# Patient Record
Sex: MALE | Race: WHITE | NOT HISPANIC OR LATINO | Employment: FULL TIME | URBAN - METROPOLITAN AREA
[De-identification: names, ages, dates, MRNs, and addresses within clinical notes are randomized per-mention and may not be internally consistent; named-entity substitution may affect disease eponyms.]

---

## 2017-06-09 ENCOUNTER — OFFICE VISIT (OUTPATIENT)
Dept: URGENT CARE | Facility: CLINIC | Age: 22
End: 2017-06-09
Payer: COMMERCIAL

## 2017-06-09 VITALS
HEART RATE: 67 BPM | SYSTOLIC BLOOD PRESSURE: 118 MMHG | DIASTOLIC BLOOD PRESSURE: 80 MMHG | HEIGHT: 72 IN | OXYGEN SATURATION: 98 % | TEMPERATURE: 97.7 F | BODY MASS INDEX: 22.35 KG/M2 | RESPIRATION RATE: 16 BRPM | WEIGHT: 165 LBS

## 2017-06-09 DIAGNOSIS — M25.562 ACUTE PAIN OF LEFT KNEE: ICD-10-CM

## 2017-06-09 DIAGNOSIS — G47.00 INSOMNIA, UNSPECIFIED TYPE: ICD-10-CM

## 2017-06-09 PROCEDURE — 99204 OFFICE O/P NEW MOD 45 MIN: CPT | Performed by: FAMILY MEDICINE

## 2017-06-09 RX ORDER — ALPRAZOLAM 0.5 MG/1
0.5 TABLET ORAL NIGHTLY PRN
Qty: 10 TAB | Refills: 0 | Status: SHIPPED | OUTPATIENT
Start: 2017-06-09 | End: 2017-09-01

## 2017-06-09 NOTE — MR AVS SNAPSHOT
Vincenzo Katz   2017 2:15 PM   Office Visit   MRN: [id number]    Department:  [facility]   Dept Phone:  [phone]    Description:  Male : 1995   Provider:  Ishmael Licea M.D.           Reason for Visit     Knee Pain x 1 day/ left knee/ non injury      Allergies as of 2017     No Known Allergies      You were diagnosed with     Acute pain of left knee   [6741499]       Insomnia, unspecified type   [[id number]]         Vital Signs     Blood Pressure Pulse Temperature Respirations Height Weight    118/80 mmHg 67 36.5 °C (97.7 °F) 16 1.829 m (6') 74.844 kg (165 lb)    Body Mass Index Oxygen Saturation Smoking Status             22.37 kg/m2 98% Current Every Day Smoker         Basic Information     Date Of Birth Sex Race Ethnicity Preferred Language    1995 Male White Non- English      Health Maintenance        Date Due Completion Dates    IMM HEP B VACCINE (1 of 3 - Primary Series) 1993 ---    IMM HEP A VACCINE (1 of 2 - Standard Series) 1994 ---    IMM HPV VACCINE (1 of 3 - Male 3 Dose Series) 2004 ---    IMM VARICELLA (CHICKENPOX) VACCINE (1 of 2 - 2 Dose Adolescent Series) 2006 ---    IMM DTaP/Tdap/Td Vaccine (1 - Tdap) 2012 ---            Current Immunizations     No immunizations on file.      Below and/or attached are the medications your provider expects you to take. Review all of your home medications and newly ordered medications with your provider and/or pharmacist. Follow medication instructions as directed by your provider and/or pharmacist. Please keep your medication list with you and share with your provider. Update the information when medications are discontinued, doses are changed, or new medications (including over-the-counter products) are added; and carry medication information at all times in the event of emergency situations     Allergies:  No Known Allergies          Medications  Valid as of: 2017 -  3:53 PM    Generic  Name Brand Name Tablet Size Instructions for use    ALPRAZolam (Tab) XANAX 0.5 MG Take 1 Tab by mouth at bedtime as needed for Sleep.        Diclofenac Sodium (Gel) Diclofenac Sodium 1 % Apply 4 g to skin as directed 3 times a day as needed.        Neomycin-Polymyxin-HC (Suspension) PEDIOTIC HC 3.5-68476-4 Place 5 Drops in right ear 4 times a day.        .                 Medicines prescribed today were sent to:     [facility] [address] [place] [address] - [address] [facility]    [address] [facility] [place] [address]    Phone: [phone] Fax: [phone]    Open 24 Hours?: Yes      Medication refill instructions:       If your prescription bottle indicates you have medication refills left, it is not necessary to call your provider’s office. Please contact your pharmacy and they will refill your medication.    If your prescription bottle indicates you do not have any refills left, you may request refills at any time through one of the following ways: The online [employer] system (except Urgent Care), by calling your provider’s office, or by asking your pharmacy to contact your provider’s office with a refill request. Medication refills are processed only during regular business hours and may not be available until the next business day. Your provider may request additional information or to have a follow-up visit with you prior to refilling your medication.   *Please Note: Medication refills are assigned a new Rx number when refilled electronically. Your pharmacy may indicate that no refills were authorized even though a new prescription for the same medication is available at the pharmacy. Please request the medicine by name with the pharmacy before contacting your provider for a refill.           MyChart Status: Patient Declined        Quit Tobacco Information     Do you want to quit using tobacco?    Quitting tobacco decreases risks of cancer, heart and lung disease, increases life  expectancy, improves sense of taste and smell, and increases spending money, among other benefits.    If you are thinking about quitting, we can help.  • [facility] Quit Tobacco Program: 772.835.6714  o Program occurs weekly for four weeks and includes pharmacist consultation on products to support quitting smoking or chewing tobacco. A provider referral is needed for pharmacist consultation.  • Tobacco Users Help Hotline: 7-800-QUIT-NOW (568-8647) or https://[place].quitlogix.org/  o Free, confidential telephone and online coaching for [place] residents. Sessions are designed on a schedule that is convenient for you. Eligible clients receive free nicotine replacement therapy.  • Nationally: www.smokefree.gov  o Information and professional assistance to support both immediate and long-term needs as you become, and remain, a non-smoker. Smokefree.gov allows you to choose the help that best fits your needs.

## 2017-06-09 NOTE — PROGRESS NOTES
Subjective:      Chief Complaint   Patient presents with   • Knee Pain     x 1 day/ left knee/ non injury               Knee Pain           Complains of left knee pain for 2 d.  Denies any precipitating injury or accident.  Describes pain as dull and achy.  Pain does not radiate.  Pain is somewhat better with motrin.                  Social History   Substance Use Topics   • Smoking status: Current Every Day Smoker -- 0.50 packs/day for 5 years     Types: Cigarettes   • Smokeless tobacco: Not on file   • Alcohol Use: Yes      Comment: social beer and taquila drinker         Past Medical History   Diagnosis Date   • Asthma          Current Outpatient Prescriptions on File Prior to Visit   Medication Sig Dispense Refill   • neomycin-polymyxin-HC (PEDIOTIC HC) 3.5-32599-1 Suspension Place 5 Drops in right ear 4 times a day. 1 Bottle 0     No current facility-administered medications on file prior to visit.               Review of Systems   Constitutional: Negative for fever and malaise/fatigue.   Respiratory: Negative for shortness of breath.    Cardiovascular: Negative for chest pain.   Neurological: Negative for tingling.   All other systems reviewed and are negative.         Objective:     Blood pressure 118/80, pulse 67, temperature 36.5 °C (97.7 °F), resp. rate 16, height 1.829 m (6'), weight 74.844 kg (165 lb), SpO2 98 %.    Physical Exam   Constitutional: patient is oriented to person, place, and time.  Patient appears well-developed and well-nourished. No distress.   HENT:   Head: Normocephalic and atraumatic.   Eyes: Conjunctivae are normal.   Cardiovascular: Normal rate.    Pulmonary/Chest: Effort normal.   Neurological: She is alert and oriented to person, place, and time.   musculoskeletal -  Left Knee - tender over  Medial aspect.   There is no swelling.   There is no erythema.  There is no crepitus.  Varus and valgus stress tests negative. Lachman, anterior/posterior drawer test negative  Strength:  Flexion 5/5, extension 5/5     Skin: Skin is warm. Patient is not diaphoretic. No erythema.   Nursing note and vitals reviewed.              Assessment/Plan:     1. Acute pain of left knee  Likely just tendonitis - he works manual labor  Advised relative rest, ice, prn    - Diclofenac Sodium (VOLTAREN) 1 % Gel; Apply 4 g to skin as directed 3 times a day as needed.  Dispense: 1 Tube; Refill: 0      Follow up in one week if no improvement, sooner if symptoms worsen.

## 2017-09-01 ENCOUNTER — OFFICE VISIT (OUTPATIENT)
Dept: URGENT CARE | Facility: CLINIC | Age: 22
End: 2017-09-01
Payer: COMMERCIAL

## 2017-09-01 VITALS
RESPIRATION RATE: 18 BRPM | DIASTOLIC BLOOD PRESSURE: 92 MMHG | HEART RATE: 124 BPM | TEMPERATURE: 98.6 F | OXYGEN SATURATION: 98 % | SYSTOLIC BLOOD PRESSURE: 150 MMHG

## 2017-09-01 DIAGNOSIS — F41.9 ANXIETY DISORDER, UNSPECIFIED TYPE: ICD-10-CM

## 2017-09-01 DIAGNOSIS — L73.9 FOLLICULITIS: ICD-10-CM

## 2017-09-01 PROCEDURE — 99214 OFFICE O/P EST MOD 30 MIN: CPT | Performed by: NURSE PRACTITIONER

## 2017-09-01 RX ORDER — ALPRAZOLAM 0.5 MG/1
0.5 TABLET ORAL NIGHTLY PRN
Qty: 10 TAB | Refills: 0 | Status: SHIPPED | OUTPATIENT
Start: 2017-09-01 | End: 2018-01-01

## 2017-09-08 ASSESSMENT — ENCOUNTER SYMPTOMS
CHILLS: 0
HEADACHES: 0
MYALGIAS: 0
NAUSEA: 0
VOMITING: 0
FEVER: 0
NERVOUS/ANXIOUS: 1

## 2017-09-08 NOTE — PROGRESS NOTES
Subjective:      Vincenzo Katz is a 22 y.o. male who presents with Anxiety (wants medication) and Wound Check (tops of feet )                Medications, Allergies and Prior Medical Hx reviewed and updated in [facility].with patient/family today     Pt has 2 c/o     One is anxiety. He states he is about to get  and take a multi country trip. The stress of making the arrangements is causing his anxiety disorder to flare. He has difficulty sleeping and loss of appetite.     Two is a rash over arms that is getting worse. He denies any fevers or chills, he denies any drainage, he denies any pain or itching.             Review of Systems   Constitutional: Negative for chills and fever.   Gastrointestinal: Negative for nausea and vomiting.   Musculoskeletal: Negative for myalgias.   Skin: Positive for rash.   Neurological: Negative for headaches.   Psychiatric/Behavioral: The patient is nervous/anxious.           Objective:     /92   Pulse (!) 124   Temp 37 °C (98.6 °F)   Resp 18   SpO2 98%      Physical Exam   Constitutional: He appears well-developed and well-nourished. No distress.   HENT:   Head: Normocephalic and atraumatic.   Eyes: Conjunctivae are normal. Pupils are equal, round, and reactive to light.   Neck: Neck supple.   Cardiovascular: Normal rate, regular rhythm and normal heart sounds.    Pulmonary/Chest: Effort normal and breath sounds normal. No respiratory distress.   Neurological: He is alert.   Awake, alert, answering questions appropriately, moving all extremeties   Skin: Skin is warm and dry. Rash noted. Rash is maculopapular.   Red macular papular rash over arm lesions approx 5 mm. No drainage, no vesicles    Psychiatric: He has a normal mood and affect. His behavior is normal.               Assessment/Plan:       1. Folliculitis  mupirocin (BACTROBAN) 2 % Ointment   2. Anxiety disorder, unspecified type  alprazolam (XANAX) 0.5 MG Tab       Do not drink alcohol or operate machinery with  this medication  Pt reviewed on [place]  Aware,  no remarkable controlled substance prescription documentation noted      Pt will go to the ER for worsening or changing symptoms as discussed, worsening or changing rash, fevers, increasing anxiety, difficulty breathing, chest pain.   Follow-up with your primary care provider or return here if not improving in 2-3 days   Discharge instructions discussed with pt/family who verbalize understanding and agreement with poc